# Patient Record
Sex: FEMALE | Race: WHITE | NOT HISPANIC OR LATINO | Employment: FULL TIME | URBAN - METROPOLITAN AREA
[De-identification: names, ages, dates, MRNs, and addresses within clinical notes are randomized per-mention and may not be internally consistent; named-entity substitution may affect disease eponyms.]

---

## 2018-05-04 ENCOUNTER — OFFICE VISIT - HEALTHEAST (OUTPATIENT)
Dept: FAMILY MEDICINE | Facility: CLINIC | Age: 23
End: 2018-05-04

## 2018-05-04 DIAGNOSIS — F41.9 ANXIETY DISORDER: ICD-10-CM

## 2018-05-04 ASSESSMENT — MIFFLIN-ST. JEOR: SCORE: 1343.9

## 2018-06-06 ENCOUNTER — COMMUNICATION - HEALTHEAST (OUTPATIENT)
Dept: FAMILY MEDICINE | Facility: CLINIC | Age: 23
End: 2018-06-06

## 2018-06-06 DIAGNOSIS — F41.9 ANXIETY DISORDER: ICD-10-CM

## 2018-06-18 ENCOUNTER — OFFICE VISIT - HEALTHEAST (OUTPATIENT)
Dept: FAMILY MEDICINE | Facility: CLINIC | Age: 23
End: 2018-06-18

## 2018-06-18 DIAGNOSIS — F41.9 ANXIETY DISORDER: ICD-10-CM

## 2018-06-18 ASSESSMENT — MIFFLIN-ST. JEOR: SCORE: 1348.89

## 2018-06-30 ENCOUNTER — COMMUNICATION - HEALTHEAST (OUTPATIENT)
Dept: FAMILY MEDICINE | Facility: CLINIC | Age: 23
End: 2018-06-30

## 2018-06-30 DIAGNOSIS — F41.9 ANXIETY DISORDER: ICD-10-CM

## 2018-07-09 ENCOUNTER — COMMUNICATION - HEALTHEAST (OUTPATIENT)
Dept: FAMILY MEDICINE | Facility: CLINIC | Age: 23
End: 2018-07-09

## 2018-07-09 DIAGNOSIS — F40.243 FEAR OF FLYING: ICD-10-CM

## 2018-07-09 DIAGNOSIS — F41.9 ANXIETY DISORDER: ICD-10-CM

## 2018-08-15 ENCOUNTER — OFFICE VISIT - HEALTHEAST (OUTPATIENT)
Dept: FAMILY MEDICINE | Facility: CLINIC | Age: 23
End: 2018-08-15

## 2018-08-15 DIAGNOSIS — F41.9 ANXIETY DISORDER: ICD-10-CM

## 2018-08-15 DIAGNOSIS — F40.243 FEAR OF FLYING: ICD-10-CM

## 2018-11-09 ENCOUNTER — COMMUNICATION - HEALTHEAST (OUTPATIENT)
Dept: FAMILY MEDICINE | Facility: CLINIC | Age: 23
End: 2018-11-09

## 2018-11-09 DIAGNOSIS — F41.9 ANXIETY DISORDER: ICD-10-CM

## 2019-01-10 ENCOUNTER — RECORDS - HEALTHEAST (OUTPATIENT)
Dept: ADMINISTRATIVE | Facility: OTHER | Age: 24
End: 2019-01-10

## 2019-01-10 LAB
CHLAMYDIA_EXT- HISTORICAL: NEGATIVE
PAP SMEAR - HIM PATIENT REPORTED: NORMAL
SPECIMEN DESCRIPTION_EXT (HISTORICAL CONVERSION): NORMAL

## 2019-01-23 ENCOUNTER — RECORDS - HEALTHEAST (OUTPATIENT)
Dept: ADMINISTRATIVE | Facility: OTHER | Age: 24
End: 2019-01-23

## 2019-02-27 ENCOUNTER — COMMUNICATION - HEALTHEAST (OUTPATIENT)
Dept: FAMILY MEDICINE | Facility: CLINIC | Age: 24
End: 2019-02-27

## 2019-02-27 DIAGNOSIS — F41.9 ANXIETY DISORDER: ICD-10-CM

## 2019-06-24 ENCOUNTER — COMMUNICATION - HEALTHEAST (OUTPATIENT)
Dept: FAMILY MEDICINE | Facility: CLINIC | Age: 24
End: 2019-06-24

## 2019-06-24 DIAGNOSIS — F40.243 FEAR OF FLYING: ICD-10-CM

## 2019-10-10 ENCOUNTER — COMMUNICATION - HEALTHEAST (OUTPATIENT)
Dept: SCHEDULING | Facility: CLINIC | Age: 24
End: 2019-10-10

## 2019-10-10 DIAGNOSIS — F41.9 ANXIETY DISORDER: ICD-10-CM

## 2019-10-10 DIAGNOSIS — F40.243 FEAR OF FLYING: ICD-10-CM

## 2019-10-26 ENCOUNTER — COMMUNICATION - HEALTHEAST (OUTPATIENT)
Dept: FAMILY MEDICINE | Facility: CLINIC | Age: 24
End: 2019-10-26

## 2019-10-26 DIAGNOSIS — F41.9 ANXIETY DISORDER: ICD-10-CM

## 2019-11-12 ENCOUNTER — OFFICE VISIT - HEALTHEAST (OUTPATIENT)
Dept: FAMILY MEDICINE | Facility: CLINIC | Age: 24
End: 2019-11-12

## 2019-11-12 ENCOUNTER — RECORDS - HEALTHEAST (OUTPATIENT)
Dept: HEALTH INFORMATION MANAGEMENT | Facility: CLINIC | Age: 24
End: 2019-11-12

## 2019-11-12 DIAGNOSIS — Z79.899 CONTROLLED SUBSTANCE AGREEMENT SIGNED: ICD-10-CM

## 2019-11-12 DIAGNOSIS — F40.243 FEAR OF FLYING: ICD-10-CM

## 2019-11-12 DIAGNOSIS — F41.9 ANXIETY DISORDER: ICD-10-CM

## 2019-11-12 LAB
AMPHETAMINES UR QL SCN: NORMAL
BARBITURATES UR QL: NORMAL
BENZODIAZ UR QL: NORMAL
CANNABINOIDS UR QL SCN: NORMAL
COCAINE UR QL: NORMAL
CREAT UR-MCNC: 31.9 MG/DL
METHADONE UR QL SCN: NORMAL
OPIATES UR QL SCN: NORMAL
OXYCODONE UR QL: NORMAL
PCP UR QL SCN: NORMAL

## 2019-11-12 ASSESSMENT — ANXIETY QUESTIONNAIRES
6. BECOMING EASILY ANNOYED OR IRRITABLE: NOT AT ALL
IF YOU CHECKED OFF ANY PROBLEMS ON THIS QUESTIONNAIRE, HOW DIFFICULT HAVE THESE PROBLEMS MADE IT FOR YOU TO DO YOUR WORK, TAKE CARE OF THINGS AT HOME, OR GET ALONG WITH OTHER PEOPLE: NOT DIFFICULT AT ALL
5. BEING SO RESTLESS THAT IT IS HARD TO SIT STILL: NOT AT ALL
1. FEELING NERVOUS, ANXIOUS, OR ON EDGE: SEVERAL DAYS
2. NOT BEING ABLE TO STOP OR CONTROL WORRYING: NOT AT ALL
4. TROUBLE RELAXING: SEVERAL DAYS
3. WORRYING TOO MUCH ABOUT DIFFERENT THINGS: SEVERAL DAYS
GAD7 TOTAL SCORE: 4
7. FEELING AFRAID AS IF SOMETHING AWFUL MIGHT HAPPEN: SEVERAL DAYS

## 2020-05-20 ENCOUNTER — COMMUNICATION - HEALTHEAST (OUTPATIENT)
Dept: FAMILY MEDICINE | Facility: CLINIC | Age: 25
End: 2020-05-20

## 2020-05-20 DIAGNOSIS — F41.9 ANXIETY DISORDER: ICD-10-CM

## 2020-09-29 ENCOUNTER — COMMUNICATION - HEALTHEAST (OUTPATIENT)
Dept: FAMILY MEDICINE | Facility: CLINIC | Age: 25
End: 2020-09-29

## 2020-09-29 DIAGNOSIS — F41.9 ANXIETY DISORDER: ICD-10-CM

## 2020-09-29 RX ORDER — HYDROXYZINE HYDROCHLORIDE 25 MG/1
TABLET, FILM COATED ORAL
Qty: 30 TABLET | Refills: 3 | Status: SHIPPED | OUTPATIENT
Start: 2020-09-29 | End: 2021-11-10

## 2020-12-17 ENCOUNTER — COMMUNICATION - HEALTHEAST (OUTPATIENT)
Dept: FAMILY MEDICINE | Facility: CLINIC | Age: 25
End: 2020-12-17

## 2020-12-17 DIAGNOSIS — F40.243 FEAR OF FLYING: ICD-10-CM

## 2020-12-22 ENCOUNTER — OFFICE VISIT - HEALTHEAST (OUTPATIENT)
Dept: FAMILY MEDICINE | Facility: CLINIC | Age: 25
End: 2020-12-22

## 2020-12-22 DIAGNOSIS — F41.9 ANXIETY DISORDER: ICD-10-CM

## 2020-12-22 DIAGNOSIS — F40.243 FEAR OF FLYING: ICD-10-CM

## 2020-12-22 LAB
AMPHETAMINES UR QL SCN: NORMAL
BARBITURATES UR QL: NORMAL
BENZODIAZ UR QL: NORMAL
CANNABINOIDS UR QL SCN: NORMAL
COCAINE UR QL: NORMAL
CREAT UR-MCNC: 116.8 MG/DL
METHADONE UR QL SCN: NORMAL
OPIATES UR QL SCN: NORMAL
OXYCODONE UR QL: NORMAL
PCP UR QL SCN: NORMAL

## 2020-12-22 RX ORDER — LORAZEPAM 0.5 MG/1
TABLET ORAL
Qty: 10 TABLET | Refills: 0 | Status: SHIPPED | OUTPATIENT
Start: 2020-12-22

## 2020-12-22 RX ORDER — SERTRALINE HYDROCHLORIDE 100 MG/1
100 TABLET, FILM COATED ORAL DAILY
Qty: 90 TABLET | Refills: 1 | Status: SHIPPED | OUTPATIENT
Start: 2020-12-22 | End: 2022-01-24

## 2021-03-02 ENCOUNTER — COMMUNICATION - HEALTHEAST (OUTPATIENT)
Dept: FAMILY MEDICINE | Facility: CLINIC | Age: 26
End: 2021-03-02

## 2021-03-02 DIAGNOSIS — F41.9 ANXIETY DISORDER: ICD-10-CM

## 2021-05-28 ASSESSMENT — ANXIETY QUESTIONNAIRES: GAD7 TOTAL SCORE: 4

## 2021-05-29 NOTE — TELEPHONE ENCOUNTER
Controlled Substance Refill Request   Patient is flying Friday.  Please review.  Medication Name:   Requested Prescriptions     Pending Prescriptions Disp Refills     LORazepam (ATIVAN) 0.5 MG tablet [Pharmacy Med Name: LORAZEPAM 0.5MG TABLETS] 2 tablet 0     Sig: TAKE 1/2 TABLET BY MOUTH WITH FLYING. IF NOT EFFECTIVE, CAN TAKE THE OTHER HALF     Date Last Fill: 8/15/19  Pharmacy: [facility]      Submit electronically to pharmacy  Controlled Substance Agreement Date Scanned:   Encounter-Level CSA Scan Date:    There are no encounter-level csa scan date.       Last office visit with prescriber/PCP: 6/18/2018 Lakisha Zepeda MD OR same dept: 8/15/2018 Loraine Morel CNP OR same specialty: 8/15/2018 Loraine Morel CNP  Last physical: Visit date not found Last MTM visit: Visit date not found

## 2021-06-01 VITALS — WEIGHT: 137.9 LBS | HEIGHT: 64 IN | BODY MASS INDEX: 23.54 KG/M2

## 2021-06-01 VITALS — BODY MASS INDEX: 24 KG/M2 | WEIGHT: 138 LBS

## 2021-06-01 VITALS — BODY MASS INDEX: 23.35 KG/M2 | WEIGHT: 136.8 LBS | HEIGHT: 64 IN

## 2021-06-02 NOTE — TELEPHONE ENCOUNTER
Pt stated she is going out of town next week, but will call back to schedule an est care appt when she gets back.

## 2021-06-02 NOTE — TELEPHONE ENCOUNTER
Patient stated she is going her trip on 10/17/19. Patient was made aware she needs to establish care with a new PCP. Patient declined to go to scheduling.    Controlled Substance Refill Request  Medication Name:   Requested Prescriptions     Pending Prescriptions Disp Refills     LORazepam (ATIVAN) 0.5 MG tablet 2 tablet 0     Sig: TAKE 1/2 TABLET BY MOUTH WITH FLYING. IF NOT EFFECTIVE, CAN TAKE THE OTHER HALF     Date Last Fill: 6/24/19  [facility]      Submit electronically to pharmacy  Controlled Substance Agreement Date Scanned:   Encounter-Level CSA Scan Date:    There are no encounter-level csa scan date.       Last office visit with prescriber/PCP: Visit date not found OR same dept: Visit date not found OR same specialty: Visit date not found  Last physical: Visit date not found Last MTM visit: Visit date not found

## 2021-06-02 NOTE — TELEPHONE ENCOUNTER
Former patient of Joe & has not established care with another provider.  Please assign refill request to covering provider per Clinic standard process.      RN cannot approve Refill Request    RN can NOT refill this medication Protocol failed and NO refill given      Dalia Rivero, [facility] Triage/Med Refill 10/11/2019    Requested Prescriptions   Pending Prescriptions Disp Refills     hydrOXYzine HCl (ATARAX) 25 MG tablet 30 tablet 5     Sig: TAKE 1 TO 2 TABLETS BY MOUTH THREE TIMES DAILY AS NEEDED FOR ITCHING       Antihistamine Refill Protocol Failed - 10/10/2019  8:48 AM        Failed - Patient has had office visit/physical in last year     Last office visit with prescriber/PCP: Visit date not found OR same dept: Visit date not found OR same specialty: Visit date not found  Last physical: Visit date not found Last MTM visit: Visit date not found   Next visit within 3 mo: Visit date not found  Next physical within 3 mo: Visit date not found  Prescriber OR PCP: No Primary Care Provider  Last diagnosis associated with med order: 1. Anxiety disorder  - hydrOXYzine HCl (ATARAX) 25 MG tablet; TAKE 1 TO 2 TABLETS BY MOUTH THREE TIMES DAILY AS NEEDED FOR ITCHING  Dispense: 30 tablet; Refill: 5    If protocol passes may refill for 12 months if within 3 months of last provider visit (or a total of 15 months).

## 2021-06-02 NOTE — TELEPHONE ENCOUNTER
Medication: Lorazepam    Last Date Filled 06/24/19     pulled: NO  Unable to pull     Only PCP Prescribing?: NO    Taken as prescribed from physician notes?:   Allison Babin is a 23 y.o. female who presents for follow up of anxiety disorder.  Was seen by Dr. Zepeda June 18, 2018 for increasing anxiety symptoms.  She was increased to 50 mg of Zoloft.  Her mother called in after that and  and spoke with Dr. Zepeda saying that the Zoloft was still not helpful.  She had a sister who is taking 100 mg of Zoloft and made a huge difference in her anxiety symptoms.  Dr. Zepeda then increased patient's dose 200 mg of Zoloft daily.  Given her a prescription for Ativan for flying anxiety, and use hydroxyzine as needed for panic attacks.  Today patient states that her her symptoms are pretty much gone.  She only has to take hydroxyzine may be once every couple weeks, she does have a an airplane flight coming up and would like a refill of Ativan for it.  She feels really good right now.  Feels this is the right dose for her.  Denies any side effects.  Current symptoms: none. She denies current suicidal and homicidal ideation. She complains of the following side effects from the treatment: none.    CSA in last year: NO    Random Utox in last year: NO    Opioids + benzodiazepines? NO

## 2021-06-02 NOTE — TELEPHONE ENCOUNTER
Please help patient set an appointment, set as physical if due.  Thank you.  Please establish care with a new provider before further refills.

## 2021-06-02 NOTE — TELEPHONE ENCOUNTER
Patient needs to come in for follow-up and establishing care with a new provider as Dr. Diaz has left.  Otherwise she will not get any more refills of her medications in the future.

## 2021-06-02 NOTE — TELEPHONE ENCOUNTER
RN cannot approve Refill Request    RN can NOT refill this medication PCP messaged that patient is overdue for Office Visit. Last office visit: 6/18/2018 Lakisha Zepeda MD Last Physical: Visit date not found Last MTM visit: Visit date not found Last visit same specialty: 8/15/2018 Loraine Morel CNP.  Next visit within 3 mo: Visit date not found  Next physical within 3 mo: Visit date not found      [facility] Connection Triage/Med Refill 10/26/2019    Requested Prescriptions   Pending Prescriptions Disp Refills     sertraline (ZOLOFT) 100 MG tablet [Pharmacy Med Name: SERTRALINE 100MG TABLETS] 90 tablet 0     Sig: TAKE 1 TABLET BY MOUTH DAILY.       SSRI Refill Protocol  Failed - 10/26/2019  3:33 AM        Failed - PCP or prescribing provider visit in last year     Last office visit with prescriber/PCP: 6/18/2018 Lakisha Zepeda MD OR same dept: Visit date not found OR same specialty: 8/15/2018 Loraine Morel CNP  Last physical: Visit date not found Last MTM visit: Visit date not found   Next visit within 3 mo: Visit date not found  Next physical within 3 mo: Visit date not found  Prescriber OR PCP: Lakisha Zepeda MD  Last diagnosis associated with med order: 1. Anxiety disorder  - sertraline (ZOLOFT) 100 MG tablet [Pharmacy Med Name: SERTRALINE 100MG TABLETS]; TAKE 1 TABLET BY MOUTH DAILY.  Dispense: 90 tablet; Refill: 0    If protocol passes may refill for 12 months if within 3 months of last provider visit (or a total of 15 months).

## 2021-06-03 VITALS
WEIGHT: 143 LBS | BODY MASS INDEX: 24.87 KG/M2 | DIASTOLIC BLOOD PRESSURE: 65 MMHG | RESPIRATION RATE: 16 BRPM | SYSTOLIC BLOOD PRESSURE: 104 MMHG | HEART RATE: 70 BPM | OXYGEN SATURATION: 98 %

## 2021-06-03 NOTE — PROGRESS NOTES
Assessment:     1. Anxiety disorder  hydrOXYzine HCl (ATARAX) 25 MG tablet    Drug Abuse 1+, Urine   2. Fear of flying  LORazepam (ATIVAN) 0.5 MG tablet    Drug Abuse 1+, Urine   3. Controlled substance agreement signed       Anxiety seems to be stable.  CSA signed.  Discussed protocol.  Patient is agreeable     Plan:      The diagnosis was discussed with the patient and evaluation and treatment plans outlined.  See orders for lab and imaging studies.     Subjective:      Allison Babin is a  female who presents for evaluation of   Chief Complaint   Patient presents with     Medication Management     Pt here today for medication check and refill request      Patient with generalized anxiety disorder is here today for medication refill for Ativan that she uses sparingly for flying.  She is due to fly to [place] and later to [place].  10 pills usually last her 1 year.  There is no misuse history.  Anxiety stable on Zoloft and hydroxyzine.  She denies any acute concerns.    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  She lives with her parents.    The following portions of the patient's history were reviewed and updated as appropriate: allergies, current medications, past family history, past medical history, past social history, past surgical history and problem list.  Allergies   Allergen Reactions     Amoxicillin Rash     Annotation: ok with amoxicillin liquid         Current Outpatient Medications on File Prior to Visit   Medication Sig Dispense Refill     sertraline (ZOLOFT) 100 MG tablet TAKE 1 TABLET BY MOUTH DAILY. 90 tablet 0     [DISCONTINUED] hydrOXYzine HCl (ATARAX) 25 MG tablet TAKE 1 TO 2 TABLETS BY MOUTH THREE TIMES DAILY AS NEEDED FOR ITCHING 30 tablet 3     [DISCONTINUED] LORazepam (ATIVAN) 0.5 MG tablet Take half a tablet with flying. If not effective, can take the other half 10 tablet 0     [DISCONTINUED] LORazepam (ATIVAN) 0.5 MG tablet  TAKE 1/2 TABLET BY MOUTH WITH FLYING. IF NOT EFFECTIVE, CAN TAKE THE OTHER HALF 2 tablet 0     No current facility-administered medications on file prior to visit.        Patient Active Problem List   Diagnosis     Anxiety disorder     Controlled substance agreement signed #10 pills Atival q6-12 months       Past Medical History:   Diagnosis Date     Amenorrhea     Created by Conversion      Anorexia     in high school     Bulimia     in high school       History reviewed. No pertinent surgical history.    Family History   Problem Relation Age of Onset     Anxiety disorder Mother      Anxiety disorder Sister      Asthma Sister      Anxiety disorder Maternal Grandfather      Hypertension Maternal Grandfather      Dementia Maternal Grandmother      Lung cancer Paternal Grandmother      Hypertension Paternal Grandfather      Heart disease Paternal Grandfather        Social History     Socioeconomic History     Marital status: Single     Spouse name: None     Number of children: None     Years of education: None     Highest education level: None   Occupational History     None   Social Needs     Financial resource strain: None     Food insecurity:     Worry: None     Inability: None     Transportation needs:     Medical: None     Non-medical: None   Tobacco Use     Smoking status: Never Smoker     Smokeless tobacco: Never Used   Substance and Sexual Activity     Alcohol use: Yes     Frequency: Monthly or less     Drinks per session: 1 or 2     Binge frequency: Never     Comment: 1-2 drinks a couple times month     Drug use: No     Sexual activity: Not Currently     Partners: Male   Lifestyle     Physical activity:     Days per week: None     Minutes per session: None     Stress: None   Relationships     Social connections:     Talks on phone: None     Gets together: None     Attends Mandaeism service: None     Active member of club or organization: None     Attends meetings of clubs or organizations: None      "Relationship status: None     Intimate partner violence:     Fear of current or ex partner: None     Emotionally abused: None     Physically abused: None     Forced sexual activity: None   Other Topics Concern     None   Social History Narrative    Taking a year off after graduating from [employer]ines[employer], [place] as a  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        Currently working with \"[employer]\".  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       Review of Systems  Constitutional: negative  Cardiovascular: negative  Behavioral/Psych: negative       Objective:   /65 (Patient Site: Left Arm, Patient Position: Sitting, Cuff Size: Adult Regular)   Pulse 70   Resp 16   Wt 143 lb (64.9 kg)   SpO2 98%   BMI 24.87 kg/m          General Appearance: healthy, alert, oriented and no distress  HEENT Exam: Oropharynx clear without lesion or exudate  Neck:  supple, no adenopathy, thyroid normal  Heart: Normal heart sounds, S1 and S2, No murmurs.  Lungs: Normal breath sounds, Clear to auscultation bilateral  Skin exam: No visible or palpable abnormalities  Neurological exam: gait normal, alert and oriented X 3  Hem/Lymph/Immuno exam: negative findings:  no cervical nodes, no supraclavicular nodes,     Little interest or pleasure in doing things: Not at all  Feeling down, depressed, or hopeless: Not at all    Feeling nervous, anxious or on edge: 1 (2019 11:00 AM)  Not being able to stop or control worry: 0 (2019 11:00 AM)  Worrying too much about different things: 1 (2019 11:00 AM)  Trouble relaxin (2019 11:00 AM)  Being so restless that is is hard to sit still: 0 (2019 11:00 AM)  Becoming easily annnoyed or irritable: 0 (2019 11:00 AM)  Feeling afraid as if something awful might happen: 1 (2019 11:00 " AM)  LUIS-7 Total: 4 (11/12/2019 11:00 AM)  How difficult did these problems make it for you to do your work, take care of things at home or get along with other people? : Not difficult at all (11/12/2019 11:00 AM)  How difficult did these problems make it for you to do your work, take care of things at home or get along with other people? : Not difficult at all (11/12/2019 11:00 AM)

## 2021-06-05 VITALS
BODY MASS INDEX: 24.52 KG/M2 | HEART RATE: 74 BPM | SYSTOLIC BLOOD PRESSURE: 103 MMHG | TEMPERATURE: 97.7 F | WEIGHT: 141 LBS | RESPIRATION RATE: 16 BRPM | DIASTOLIC BLOOD PRESSURE: 68 MMHG

## 2021-06-08 NOTE — TELEPHONE ENCOUNTER
Refill Approved    Rx renewed per Medication Renewal Policy. Medication was last renewed on 10/28/19, last OV 11/12/19 .    Jolene Abdul, [facility] Triage/Med Refill 5/25/2020     Requested Prescriptions   Pending Prescriptions Disp Refills     sertraline (ZOLOFT) 100 MG tablet [Pharmacy Med Name: SERTRALINE 100MG TABLETS] 90 tablet 0     Sig: TAKE 1 TABLET BY MOUTH DAILY       SSRI Refill Protocol  Passed - 5/20/2020  5:07 PM        Passed - PCP or prescribing provider visit in last year     Last office visit with prescriber/PCP: Visit date not found OR same dept: 11/12/2019 Mindy Martinez MD OR same specialty: 11/12/2019 Mindy Martinez MD  Last physical: Visit date not found Last MTM visit: Visit date not found   Next visit within 3 mo: Visit date not found  Next physical within 3 mo: Visit date not found  Prescriber OR PCP: Geno Moore MD  Last diagnosis associated with med order: 1. Anxiety disorder  - sertraline (ZOLOFT) 100 MG tablet [Pharmacy Med Name: SERTRALINE 100MG TABLETS]; TAKE 1 TABLET BY MOUTH DAILY.  Dispense: 90 tablet; Refill: 0    If protocol passes may refill for 12 months if within 3 months of last provider visit (or a total of 15 months).

## 2021-06-11 NOTE — TELEPHONE ENCOUNTER
Refill Approved    Rx renewed per Medication Renewal Policy. Medication was last renewed on 11/12/19.    Dalia Rivero, [facility] Connection Triage/Med Refill 9/29/2020     Requested Prescriptions   Pending Prescriptions Disp Refills     hydrOXYzine HCL (ATARAX) 25 MG tablet 30 tablet 3     Sig: TAKE 1 TO 2 TABLETS BY MOUTH THREE TIMES DAILY AS NEEDED FOR ITCHING       Antihistamine Refill Protocol Passed - 9/29/2020  3:18 PM        Passed - Patient has had office visit/physical in last year     Last office visit with prescriber/PCP: 6/18/2018 Lakisha Zepeda MD OR same dept: 11/12/2019 Mindy Martinez MD OR same specialty: 11/12/2019 Mindy Martinez MD  Last physical: Visit date not found Last MTM visit: Visit date not found   Next visit within 3 mo: Visit date not found  Next physical within 3 mo: Visit date not found  Prescriber OR PCP: Lakisha Zepeda MD  Last diagnosis associated with med order: 1. Anxiety disorder  - hydrOXYzine HCL (ATARAX) 25 MG tablet; TAKE 1 TO 2 TABLETS BY MOUTH THREE TIMES DAILY AS NEEDED FOR ITCHING  Dispense: 30 tablet; Refill: 3    If protocol passes may refill for 12 months if within 3 months of last provider visit (or a total of 15 months).

## 2021-06-11 NOTE — TELEPHONE ENCOUNTER
Refill Request  Did you contact pharmacy: No  Medication name:   Requested Prescriptions     Pending Prescriptions Disp Refills     hydrOXYzine HCL (ATARAX) 25 MG tablet 30 tablet 3     Sig: TAKE 1 TO 2 TABLETS BY MOUTH THREE TIMES DAILY AS NEEDED FOR ITCHING   Who prescribed the medication: HARVEY Castillo  Requested Pharmacy: [facility] # [id number]  Is patient out of medication: Yes  Patient notified refills processed in 3 business days:  yes  Okay to leave a detailed message: no

## 2021-06-13 ENCOUNTER — HEALTH MAINTENANCE LETTER (OUTPATIENT)
Age: 26
End: 2021-06-13

## 2021-06-13 NOTE — TELEPHONE ENCOUNTER
Requested Prescriptions     Pending Prescriptions Disp Refills     LORazepam (ATIVAN) 0.5 MG tablet 10 tablet 0     Sig: Take half a tablet with flying. If not effective, can take the other half     Last Office Visit:  11/12/2019  Last Refill:  11/12/2019  CSA:  N/A  Date of Last Labs:  N/A

## 2021-06-13 NOTE — PROGRESS NOTES
Assessment/plan   1. Fear of flying  2. Anxiety disorder  Anxiety stable with PHQ 9 and darrion 7 scores of 0.  Sertraline refilled.   reviewed, no Ativan prescriptions in the past year, she uses this very infrequently for fear of flying.  We will obtain a UDS and as long as appropriate will send in a refill.  Controlled substance agreement signed.  - Drug Abuse 1+, Urine  - sertraline (ZOLOFT) 100 MG tablet; Take 1 tablet (100 mg total) by mouth daily.  Dispense: 90 tablet; Refill: 1      Jenn Rawls DO    Options for treatment and follow-up care were reviewed with the patient. Allison Babin engaged in the decision making process and verbalized understanding of the options discussed and agreed with the final plan.    This note has been dictated using voice recognition software. Any grammatical or context distortions are unintentional and inherent to the software.    Subjective:      HPI: Allison Babin is a 25 y.o. female who is here for:    Chief Complaint   Patient presents with     Medication Management     Medication Refill     Patient has known fear of flying and uses Ativan during her travels.  Also takes hydroxyzine as needed for anxiety symptoms.  She typically takes 0.5 to 1 mg depending on the length of the flight.  She is going to [place] for [place] and would like her Ativan refilled.  It has been over a year since her last refill.  Her mood has been stable on the sertraline.    Medical History:  Patient Active Problem List   Diagnosis     Anxiety disorder     Controlled substance agreement signed #10 pills Atival q6-12 months     Past Medical History:   Diagnosis Date     Amenorrhea     Created by Conversion      Anorexia     in high school     Bulimia     in high school       Medications:  Current Outpatient Medications   Medication Sig Dispense Refill     hydrOXYzine HCL (ATARAX) 25 MG tablet TAKE 1 TO 2 TABLETS BY MOUTH THREE TIMES DAILY AS NEEDED FOR ITCHING 30 tablet 3     LORazepam  (ATIVAN) 0.5 MG tablet Take half a tablet with flying. If not effective, can take the other half 10 tablet 0     sertraline (ZOLOFT) 100 MG tablet Take 1 tablet (100 mg total) by mouth daily. 90 tablet 1     No current facility-administered medications for this visit.        Objective:    /68 (Patient Site: Left Arm, Patient Position: Sitting, Cuff Size: Adult Regular)   Pulse 74   Temp 97.7  F (36.5  C) (Oral)   Resp 16   Wt 141 lb (64 kg)   LMP 11/20/2020   BMI 24.52 kg/m      Physical Exam:   General: Alert, pleasant, cooperative, NAD  HEENT: NC/AT, EOMI, PERRL, normal conjunctivae and sclerae  CV: RRR, no murmurs, rubs or gallops, 2+ peripheral pulses  Respiratory: normal effort, lungs CTAB with good aeration throughout  Psych: mood neutral and affect appropriate  Neuro: A&O x 3, CN II-XII grossly intact, no focal deficits  Skin: warm, no rashes on exposed areas of skin    No results found for this or any previous visit (from the past 24 hour(s)).

## 2021-06-16 PROBLEM — F41.9 ANXIETY DISORDER: Status: ACTIVE | Noted: 2018-05-04

## 2021-06-16 PROBLEM — Z79.899 CONTROLLED SUBSTANCE AGREEMENT SIGNED: Status: ACTIVE | Noted: 2019-11-12

## 2021-06-17 NOTE — PROGRESS NOTES
ASSESSMENT/ PLAN    1. Anxiety disorder  PHQ-9 is 1 and LUIS-7 is 6 today. Not improving and worsening. Triggers include flying or driving.  Panic attacks a couple times a week.  Sister with similar anxiety has seen benefit from Zoloft.  Mother takes hydroxyzine as needed for panic attacks.  Will start patient on Zoloft 50 mg daily as well as hydroxyzine 1-2 tablets by mouth 3 times daily as needed.  Return to clinic in 2 weeks for follow-up.  She is on a waiting list to see a [employer] counselor through her school.  - sertraline (ZOLOFT) 50 MG tablet; Take 1 tablet (50 mg total) by mouth daily.  Dispense: 30 tablet; Refill: 0  - hydrOXYzine HCl (ATARAX) 25 MG tablet; Take 1-2 tablets (25-50 mg total) by mouth 3 (three) times a day as needed for anxiety.  Dispense: 30 tablet; Refill: 1    This note was created using Dragon dictation software, spelling errors may occur.     Lakisha Zepeda MD      SUBJECTIVE   Allison Scalesong is a 22 y.o. 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 Has had symptoms for about 8 years. Getting worse. Her mother is here with her. It's gotten better and not worse. Has had panic attacks. Her sister has started taking medication Sertraline for anxiety and seeing benefit. Driving and flying trigger her panic attacks. She has claustrophobia. She has to sit by the door at school so she can quickly leave the room. Her panic attacks occur a few times a week, lasting a couple minutes, has SOB, heart racing. Calling someone helps with these attacks. Had eating disorder when she was younger - aged [age], both anorexia and bulimia, didn't have any treatment for this. No longer having any symptoms for eating disorder. Denies nightmares. Denies self injurious behavior. She does get her periods, getting more regular. She denies sexual abuse  "or physical abuse and feels safe. She was traumatized when she was in middle school when another kid from school committed suicide although she did not know this kid could well.  She denies drug use, alcohol use or tobacco use.  She is not currently sexually active and does not have any high risk sexual behavior.  She denies previous suicidal attempts.  She is currently not suicidal.  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      Review of Systems:  Negative except as noted in HPI      The following portions of the patient's history were reviewed and updated as appropriate: past medical history, past surgical history, family history, allergies, current medications and problem list.    Medical History  Active Ambulatory (Non-Hospital) Problems    Diagnosis     Anxiety disorder     Past Medical History:   Diagnosis Date     Amenorrhea      Anorexia      Bulimia        Surgical History  She  has no past surgical history on file.    Social History  Reviewed, and she  reports that she has never smoked. She has never used smokeless tobacco. She reports that she drinks alcohol. She reports that she does not use illicit drugs.    Family History  Reviewed, and family history includes Anxiety disorder in her maternal grandfather, mother, and sister.    Medications  Reviewed and reconciled    Allergies  Allergies   Allergen Reactions     Amoxicillin Rash     Annotation: ok with amoxicillin liquid           OBJECTIVE  Physical Exam:  Vital signs: /72 (Patient Site: Left Arm, Patient Position: Sitting, Cuff Size: Adult Regular)  Pulse 84  Temp 98.2  F (36.8  C) (Oral)   Resp 12  Ht 5' 3.58\" (1.615 m)  Wt 136 lb 12.8 oz (62.1 kg)  LMP 04/15/2018  BMI 23.79 kg/m2  Weight: 136 lb 12.8 oz (62.1 kg)    General appearance: pleasant, appears stated age, cooperative and " in no distress  Eyes: EOMs intact, PERRL, conjunctivae normal.   ENT: moist oral mucosa, posterior oropharynx normal, thyroid normal to palpation.  Lymph: no cervical/supraclavicular adenopathy  Respiratory: clear to auscultation bilaterally, good air movement throughout, no wheezing or crackles, speaking full sentences without difficulty  Cardiovascular: regular rate and rhythm, no murmur appreciated, no leg edema  Abdomen: active bowel sounds, soft, non-tender, non-distended  Skin: no rashes  Neuro: alert oriented x 3, grossly normal otherwise  Psych: Anxious affect, mood and affect congruent.  Pressured speech, insight fair, appropriate conversation

## 2021-06-18 NOTE — PROGRESS NOTES
ASSESSMENT/ PLAN    1. Anxiety disorder  Improving.  We will continue with current dose of Zoloft 50 mg daily.  PHQ 9 today 0.  LUIS 7 today's 1.  No panic attacks.  No side effects from Zoloft.  Return in 6 months for annual physical examination and follow-up of anxiety.        This note was created using Dragon dictation software, spelling errors may occur.     Lakisha Zepeda MD        SUBJECTIVE   Allison Babin is a 23 y.o. old female with a past medical history of anxiety who presented to clinic today for further evaluation of follow up of anxiety. PHQ 9 today is 0 and LUIS 7 today is 1.  Patient reports that the Zoloft 50 mg daily works well for her.  She has not have not needed to use hydroxyzine as needed for anxiety.  Her panic attacks have not happened recently.  She denies any side effects from Zoloft.  She would like to continue with the current dose.  Denies any suicidal ideation or homicidal ideation.  No eating disorder symptoms.  No self-injurious behavior.  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  In the year she will enter a [employer] counseling masters program.  Her sister is here with her today.  Her sister also has diabetes and is on Zoloft as well.  Patient has no other concerns today.      Review of Systems:  Negative except as noted in HPI      The following portions of the patient's history were reviewed and updated as appropriate: past medical history, past surgical history, family history, allergies, current medications and problem list.    Medical History  Active Ambulatory (Non-Hospital) Problems    Diagnosis     Anxiety disorder     Past Medical History:   Diagnosis Date     Amenorrhea      Anorexia      Bulimia        Surgical History  She  has no past surgical history on file.    Social History  Reviewed, and she  reports that she has never smoked. She has never used  "smokeless tobacco. She reports that she drinks alcohol. She reports that she does not use illicit drugs.   Social History     Social History Narrative    Taking a year off after graduating from [employer] kinesiology, [employer] as a  and also as a dance instructor. Will be entering [employer] counseling program in a year.       Family History  Reviewed, and family history includes Anxiety disorder in her maternal grandfather, mother, and sister; Asthma in her sister; Dementia in her maternal grandmother; Heart disease in her paternal grandfather; Hypertension in her maternal grandfather and paternal grandfather; Lung cancer in her paternal grandmother.    Medications  Reviewed and reconciled    Allergies  Allergies   Allergen Reactions     Amoxicillin Rash     Annotation: ok with amoxicillin liquid           OBJECTIVE  Physical Exam:  Vital signs: BP 94/60 (Patient Site: Left Arm, Patient Position: Sitting, Cuff Size: Adult Regular)  Pulse 68  Temp 98  F (36.7  C) (Oral)   Resp 16  Ht 5' 3.58\" (1.615 m)  Wt 137 lb 14.4 oz (62.6 kg)  BMI 23.98 kg/m2  Weight: 137 lb 14.4 oz (62.6 kg)    General appearance: pleasant, appears stated age, cooperative and in no distress  Eyes: EOMs intact, PERRL, conjunctivae normal.   ENT: moist oral mucosa, posterior oropharynx normal, thyroid normal to palpation, no lump or mass  Lymph: no cervical/supraclavicular adenopathy  Respiratory: clear to auscultation bilaterally, good air movement throughout, no wheezing or crackles, speaking full sentences without difficulty  Cardiovascular: regular rate and rhythm, no murmur appreciated, no leg edema  Abdomen: active bowel sounds, soft, non-tender, non-distended  Skin: no rashes  Neuro: alert oriented x 3, grossly normal otherwise  Psych: normal affect, affect and mood congruent, insight appropriate, speech normal rate, appropriate conversation       "

## 2021-06-19 NOTE — PROGRESS NOTES
Assessment:      Anxiety Disorder - improving      Plan:      Medications: Ativan, Zoloft and hydroxyzine as needed for panic attacks.  Reviewed concept of anxiety as biochemical imbalance of neurotransmitters and rationale for treatment.  Instructed patient to contact office or on-call physician promptly should condition worsen or any new symptoms appear and provided on-call telephone numbers. IF THE PATIENT HAS ANY SUICIDAL OR HOMICIDAL IDEATIONS, CALL THE OFFICE, DISCUSS WITH A SUPPORT MEMBER, OR GO TO THE ER IMMEDIATELY. Patient was agreeable with this plan.  Follow up: Ativan, Zoloft and Hydroxyzine as needed for panic attacks 1 year or sooner if needed.     Subjective:       Allison Babin is a 23 y.o. female who presents for follow up of anxiety disorder.  Was seen by Dr. Zepeda June 18, 2018 for increasing anxiety symptoms.  She was increased to 50 mg of Zoloft.  Her mother called in after that and  and spoke with Dr. Zepeda saying that the Zoloft was still not helpful.  She had a sister who is taking 100 mg of Zoloft and made a huge difference in her anxiety symptoms.  Dr. Zepeda then increased patient's dose 200 mg of Zoloft daily.  Given her a prescription for Ativan for flying anxiety, and use hydroxyzine as needed for panic attacks.  Today patient states that her her symptoms are pretty much gone.  She only has to take hydroxyzine may be once every couple weeks, she does have a an airplane flight coming up and would like a refill of Ativan for it.  She feels really good right now.  Feels this is the right dose for her.  Denies any side effects.  Current symptoms: none. She denies current suicidal and homicidal ideation. She complains of the following side effects from the treatment: none.    LUIS-7 0    The following portions of the patient's history were reviewed and updated as appropriate: allergies, current medications, past family history, past medical history, past social history, past  surgical history and problem list.      Objective:      /63  Pulse (!) 58  Resp 18  Wt 138 lb (62.6 kg)  BMI 24 kg/m2   General:  alert, appears stated age and cooperative   Affect/Behavior:  full facial expressions, good grooming, good insight, normal perception, normal reasoning, normal speech pattern and content and normal thought patterns none

## 2021-06-19 NOTE — LETTER
Letter by Mindy Martinez MD at      Author: Mindy Martinez MD Service: -- Author Type: --    Filed:  Encounter Date: 11/12/2019 Status: Signed         [facility] MEDICINE/OB  11/12/19    Patient: Allison Babin  YOB: 1995  Medical Record Number: [id number]  CSN: [id number]                                                                              Non-opioid Controlled Substance Agreement    I understand that my care provider has prescribed a controlled substance to help manage my condition(s). I am taking this medicine to help me function or work. I know this is strong medicine, and that it can cause serious side effects. Controlled substances can be sedating, addicting and may cause a dependency on the drug. They can affect my ability to drive or think, and cause depression. They need to be taken exactly as prescribed. Combining controlled substances with certain medicines or chemicals (such as cocaine, sedatives and tranquilizers, sleeping pills, meth) can be dangerous or even fatal. Also, if I stop controlled substances suddenly, I may have severe withdrawal symptoms.  If not helpful, I may be asked to stop them.    The risks, benefits, and side effects of these medicine(s) were explained to me. I agree that:    1. I will take part in other treatments as advised by my care team. This may be psychiatry or counseling, physical therapy, behavioral therapy, group treatment or a referral to a pain clinic. I will reduce or stop my medicine when my care team tells me to do so.  2. I will take my medicines as prescribed. I will not change the dose or schedule unless my care team tells me to. There will be no refills if I run out early.  I may be contactedwithout warning and asked to complete a urine drug test or pill count at any time.   3. I will keep all my appointments, and understand this is part of the monitoring of controlled substances. My care team may require an office visit for  EVERY controlled substance refill. If I miss appointments or dont follow instructions, my care team may stop my medicine.  4. I will not ask other providers to prescribe controlled substances, and I will not accept controlled substances from other people. If I need another prescribed controlled substance for a new reason, I will tell my care team within 1 business day.  5. I will use one pharmacy to fill all of my controlled substance prescriptions, and it is up to me to make sure that I do not run out of my medicines on weekends or holidays. If my care team is willing to refill my controlled substance prescription without a visit, I must request refills only during office hours, refills may take up to 3 days to process, and it may take up to 5 to 7 days for my medicine to be mailed and ready at my pharmacy. Prescriptions will not be mailed anywhere except my pharmacy.    6. I am responsible for my prescriptions. If the medicine/prescription is lost or stolen, it will not be replaced. I also agree not to share controlled substance medicines with anyone.          [facility] FAMILY MEDICINE/OB  11/12/19  Patient:  Allison Babin  YOB: 1995  Medical Record Number: [id number]  CSN: [id number]    7. I agree to not use ANY illegal or recreational drugs. This includes marijuana, cocaine, bath salts or other drugs. I agree not to use alcohol unless my care team says I may. I agree to give urine samples whenever asked. If I dont give a urine sample, the care team may stop my medicine.    8. If I enroll in the [place] Medical Marijuana program, I will tell my care team. I will also sign an agreement to share my medical records with my care team.    9. I will bring in my list of medicines (or my medicine bottles) each time I come to the clinic.   10. I will tell my care team right away if I become pregnant or have a new medical problem treated outside of my regular clinic.  11. I understand that this medicine  can affect my thinking and judgment. It may be unsafe for me to drive, use machinery and do dangerous tasks. I will not do any of these things until I know how the medicine affects me. If my dose changes, I will wait to see how it affects me. I will contact my care team if I have concerns about medicine side effects.    I understand that if I do not follow any of the conditions above, my prescriptions or treatment may be stopped.      I agree that my provider, clinic care team, and pharmacy may work with any city, state or federal law enforcement agency that investigates the misuse, sale, or other diversion of my controlled medicine. I will allow my provider to discuss my care with or share a copy of this agreement with any other treating provider, pharmacy or emergency room where I receive care. I agree to give up (waive) any right of privacy or confidentiality with respect to these consents.   I have read this agreement and have asked questions about anything I did not understand.    ___________________________________________________________________________  Patient signature - Date/Time  -Allison Babin                                      ___________________________________________________________________________  Witness signature                                                                    ___________________________________________________________________________  Provider signature- Mindy Martinez MD

## 2021-06-21 NOTE — LETTER
Letter by Jenn Rawls DO at      Author: Jenn Rawls DO Service: -- Author Type: --    Filed:  Encounter Date: 12/22/2020 Status: (Other)         [facility]  12/22/20    Patient: Allison Babin  YOB: 1995  Medical Record Number: [id number]  CSN: [id number]                                                                              Non-opioid Controlled Substance Agreement    I understand that my care provider has prescribed a controlled substance to help manage my condition(s). I am taking this medicine to help me function or work. I know this is strong medicine, and that it can cause serious side effects. Controlled substances can be sedating, addicting and may cause a dependency on the drug. They can affect my ability to drive or think, and cause depression. They need to be taken exactly as prescribed. Combining controlled substances with certain medicines or chemicals (such as cocaine, sedatives and tranquilizers, sleeping pills, meth) can be dangerous or even fatal. Also, if I stop controlled substances suddenly, I may have severe withdrawal symptoms.  If not helpful, I may be asked to stop them.    The risks, benefits, and side effects of these medicine(s) were explained to me. I agree that:    1. I will take part in other treatments as advised by my care team. This may be psychiatry or counseling, physical therapy, behavioral therapy, group treatment or a referral to a pain clinic. I will reduce or stop my medicine when my care team tells me to do so.  2. I will take my medicines as prescribed. I will not change the dose or schedule unless my care team tells me to. There will be no refills if I run out early.  I may be contactedwithout warning and asked to complete a urine drug test or pill count at any time.   3. I will keep all my appointments, and understand this is part of the monitoring of controlled substances. My care team may require an office visit  for EVERY controlled substance refill. If I miss appointments or dont follow instructions, my care team may stop my medicine.  4. I will not ask other providers to prescribe controlled substances, and I will not accept controlled substances from other people. If I need another prescribed controlled substance for a new reason, I will tell my care team within 1 business day.  5. I will use one pharmacy to fill all of my controlled substance prescriptions, and it is up to me to make sure that I do not run out of my medicines on weekends or holidays. If my care team is willing to refill my controlled substance prescription without a visit, I must request refills only during office hours, refills may take up to 3 days to process, and it may take up to 5 to 7 days for my medicine to be mailed and ready at my pharmacy. Prescriptions will not be mailed anywhere except my pharmacy.    6. I am responsible for my prescriptions. If the medicine/prescription is lost or stolen, it will not be replaced. I also agree not to share controlled substance medicines with anyone.          [facility]  12/22/20  Patient:  Allison Babin  YOB: 1995  Medical Record Number: [id number]  CSN: [id number]    7. I agree to not use ANY illegal or recreational drugs. This includes marijuana, cocaine, bath salts or other drugs. I agree not to use alcohol unless my care team says I may. I agree to give urine samples whenever asked. If I dont give a urine sample, the care team may stop my medicine.    8. If I enroll in the [place] Medical Marijuana program, I will tell my care team. I will also sign an agreement to share my medical records with my care team.    9. I will bring in my list of medicines (or my medicine bottles) each time I come to the clinic.   10. I will tell my care team right away if I become pregnant or have a new medical problem treated outside of my regular clinic.  11. I understand that  this medicine can affect my thinking and judgment. It may be unsafe for me to drive, use machinery and do dangerous tasks. I will not do any of these things until I know how the medicine affects me. If my dose changes, I will wait to see how it affects me. I will contact my care team if I have concerns about medicine side effects.    I understand that if I do not follow any of the conditions above, my prescriptions or treatment may be stopped.      I agree that my provider, clinic care team, and pharmacy may work with any city, state or federal law enforcement agency that investigates the misuse, sale, or other diversion of my controlled medicine. I will allow my provider to discuss my care with or share a copy of this agreement with any other treating provider, pharmacy or emergency room where I receive care. I agree to give up (waive) any right of privacy or confidentiality with respect to these consents.   I have read this agreement and have asked questions about anything I did not understand.    ___________________________________________________________________________  Patient signature - Date/Time  -Allison Babin                                      ___________________________________________________________________________  Witness signature                                                                    ___________________________________________________________________________  Provider signature- Jenn Rawls DO

## 2021-06-24 NOTE — TELEPHONE ENCOUNTER
Refill Approved    Rx renewed per Medication Renewal Policy. Medication was last renewed on 8/15/2018 with 1 refill.   Last office visit: 8/15/2018 with PADMINI Morel CNP @ [facility] clinic.     Zeina Ng, [facility] Triage/Med Refill 2/27/2019     Requested Prescriptions   Pending Prescriptions Disp Refills     hydrOXYzine HCl (ATARAX) 25 MG tablet [Pharmacy Med Name: HYDROXYZINE HCL 25MG TABS (WHITE)] 30 tablet 0     Sig: TAKE 1 TO 2 TABLETS BY MOUTH THREE TIMES DAILY AS NEEDED FOR ITCHING    Antihistamine Refill Protocol Passed - 2/27/2019  7:40 PM       Passed - Patient has had office visit/physical in last year    Last office visit with prescriber/PCP: 8/15/2018 Loraine Morel CNP OR same dept: 8/15/2018 Loraine Morel CNP OR same specialty: 8/15/2018 Loraine Morel CNP  Last physical: Visit date not found Last MTM visit: Visit date not found   Next visit within 3 mo: Visit date not found  Next physical within 3 mo: Visit date not found  Prescriber OR PCP: Loraine Morel CNP  Last diagnosis associated with med order: 1. Anxiety disorder  - hydrOXYzine HCl (ATARAX) 25 MG tablet [Pharmacy Med Name: HYDROXYZINE HCL 25MG TABS (WHITE)]; TAKE 1 TO 2 TABLETS BY MOUTH THREE TIMES DAILY AS NEEDED FOR ITCHING  Dispense: 30 tablet; Refill: 0    If protocol passes may refill for 12 months if within 3 months of last provider visit (or a total of 15 months).

## 2021-06-25 ENCOUNTER — RECORDS - HEALTHEAST (OUTPATIENT)
Dept: FAMILY MEDICINE | Facility: CLINIC | Age: 26
End: 2021-06-25

## 2021-06-25 DIAGNOSIS — F40.243 FEAR OF FLYING: ICD-10-CM

## 2021-06-26 NOTE — TELEPHONE ENCOUNTER
Controlled Substance Refill Request  Medication Name:   Requested Prescriptions     Pending Prescriptions Disp Refills     LORazepam (ATIVAN) 0.5 MG tablet [Pharmacy Med Name: LORAZEPAM 0.5MG TABLETS] 10 tablet 0     Sig: TAKE 1/2 TABLET BY MOUTH WITH FLYING. IF NOT EFFECTIVE MAY TAKE THE OTHER 1/2 TABLET     Date Last Fill: 12/22/20  [facility]ed [facility]acy: [facility]  Submit electronically to pharmacy  Controlled Substance Agreement on file:   Encounter-Level CSA Scan Date:    There are no encounter-level csa scan date.        Last office visit:  12/22/20         Melissa Rosenbaum RN, BSN Nurse Triage Advisor 11:17 AM 6/25/2021

## 2021-06-28 RX ORDER — LORAZEPAM 0.5 MG/1
TABLET ORAL
Qty: 10 TABLET | Refills: 0 | Status: SHIPPED | OUTPATIENT
Start: 2021-06-28 | End: 2022-02-18

## 2021-07-03 NOTE — ADDENDUM NOTE
Addendum Note by Louis De La Vega DO at 12/22/2020  7:20 AM     Author: Louis De La Vega DO Service: -- Author Type: Physician    Filed: 12/22/2020  3:14 PM Encounter Date: 12/22/2020 Status: Signed    : Louis De La Vega DO (Physician)    Addended by: LOUIS DE LA VEGA on: 12/22/2020 03:14 PM        Modules accepted: Orders

## 2021-07-07 NOTE — TELEPHONE ENCOUNTER
Patient seen 12/2020 for anxiety and fear of flying.  Short course of Ativan prescribed at that time after appropriate UDS was obtained.   reviewed and appropriate.  Will send an additional few tabs.  She will need a office visit follow-up prior to further refills.

## 2021-07-07 NOTE — TELEPHONE ENCOUNTER
Terell Barajas, I haven't seen this patient since 2018. Looks like you saw her in 2020. i'll let you decide if you are comfortable with refilling for patient.

## 2021-10-03 ENCOUNTER — HEALTH MAINTENANCE LETTER (OUTPATIENT)
Age: 26
End: 2021-10-03

## 2021-11-09 DIAGNOSIS — F41.9 ANXIETY DISORDER: ICD-10-CM

## 2021-11-10 RX ORDER — HYDROXYZINE HYDROCHLORIDE 25 MG/1
TABLET, FILM COATED ORAL
Qty: 540 TABLET | Refills: 0 | Status: SHIPPED | OUTPATIENT
Start: 2021-11-10

## 2021-11-10 NOTE — TELEPHONE ENCOUNTER
"Last Written Prescription Date:  9/29/20  Last Fill Quantity: 30,  # refills: 3   Last office visit provider:  12/22/20     Requested Prescriptions   Pending Prescriptions Disp Refills     hydrOXYzine (ATARAX) 25 MG tablet [Pharmacy Med Name: HYDROXYZINE HCL 25MG TABS (WHITE)] 30 tablet 3     Sig: TAKE 1 TO 2 TABLETS BY MOUTH THREE TIMES DAILY AS NEEDED FOR ITCHING       Antihistamines Protocol Passed - 11/9/2021 12:42 PM        Passed - Recent (12 mo) or future (30 days) visit within the authorizing provider's specialty     Patient has had an office visit with the authorizing provider or a provider within the authorizing providers department within the previous 12 mos or has a future within next 30 days. See \"Patient Info\" tab in inbasket, or \"Choose Columns\" in Meds & Orders section of the refill encounter.              Passed - Patient is age 3 or older     Apply age and/or weight-based dosing for peds patients age 3 and older.    Forward request to provider for patients under the age of 3.          Passed - Medication is active on med list             Rupert Guzmán RN 11/10/21 2:54 PM  "

## 2022-01-19 DIAGNOSIS — F41.9 ANXIETY DISORDER: ICD-10-CM

## 2022-01-23 NOTE — TELEPHONE ENCOUNTER
"Routing refill request to provider for review/approval because:  A break in medication  Patient needs to be seen because it has been more than 1 year since last office visit.    Last Written Prescription Date:  12/22/2020  Last Fill Quantity: 90,  # refills: 1   Last office visit provider:  12/22/2020     Requested Prescriptions   Pending Prescriptions Disp Refills     sertraline (ZOLOFT) 100 MG tablet [Pharmacy Med Name: SERTRALINE 100MG TABLETS] 90 tablet 1     Sig: TAKE 1 TABLET(100 MG) BY MOUTH DAILY       SSRIs Protocol Failed - 1/19/2022  5:57 PM        Failed - Recent (12 mo) or future (30 days) visit within the authorizing provider's specialty     Patient has had an office visit with the authorizing provider or a provider within the authorizing providers department within the previous 12 mos or has a future within next 30 days. See \"Patient Info\" tab in inbasket, or \"Choose Columns\" in Meds & Orders section of the refill encounter.              Passed - Medication is active on med list        Passed - Patient is age 18 or older        Passed - No active pregnancy on record        Passed - No positive pregnancy test in last 12 months             Phyllis Vinson RN 01/22/22 11:01 PM  "

## 2022-01-24 RX ORDER — SERTRALINE HYDROCHLORIDE 100 MG/1
100 TABLET, FILM COATED ORAL DAILY
Qty: 30 TABLET | Refills: 0 | Status: SHIPPED | OUTPATIENT
Start: 2022-01-24 | End: 2022-02-18

## 2022-01-24 NOTE — TELEPHONE ENCOUNTER
Left message to call back. Please notify pt a refill was sent to her pharmacy but needs an ov for further refills. Only a 30 day supply was sent.

## 2022-01-25 DIAGNOSIS — F41.9 ANXIETY DISORDER: ICD-10-CM

## 2022-01-26 RX ORDER — SERTRALINE HYDROCHLORIDE 100 MG/1
TABLET, FILM COATED ORAL
Qty: 30 TABLET | Refills: 0 | OUTPATIENT
Start: 2022-01-26

## 2022-01-26 NOTE — TELEPHONE ENCOUNTER
Duplicate Rx, medication was sent in recently to same pharmacy requested.     Refill was just sent in on 1/24/22.    sertraline (ZOLOFT) 100 MG tablet 30 tablet 0 1/24/2022  No   Sig - Route: Take 1 tablet (100 mg) by mouth daily Need to be seen before further refills - Oral   Sent to pharmacy as: Sertraline HCl 100 MG Oral Tablet (ZOLOFT)   Class: E-Prescribe   Order: [id number]   E-Prescribing Status: Sent to pharmacy (1/24/2022  9:07 AM CST)         Melissa Rosenbaum RN, BSN Nurse Triage Advisor 3:31 PM 1/26/2022

## 2022-02-18 ENCOUNTER — VIRTUAL VISIT (OUTPATIENT)
Dept: FAMILY MEDICINE | Facility: CLINIC | Age: 27
End: 2022-02-18
Payer: COMMERCIAL

## 2022-02-18 DIAGNOSIS — Z11.59 ENCOUNTER FOR HEPATITIS C SCREENING TEST FOR LOW RISK PATIENT: ICD-10-CM

## 2022-02-18 DIAGNOSIS — Z13.220 SCREENING FOR HYPERLIPIDEMIA: Primary | ICD-10-CM

## 2022-02-18 DIAGNOSIS — F41.9 ANXIETY DISORDER, UNSPECIFIED TYPE: ICD-10-CM

## 2022-02-18 PROCEDURE — 99214 OFFICE O/P EST MOD 30 MIN: CPT | Mod: GT | Performed by: FAMILY MEDICINE

## 2022-02-18 NOTE — PROGRESS NOTES
Allison is a 26 year old who is being evaluated via a billable video visit.      How would you like to obtain your AVS? MyChart  If the video visit is dropped, the invitation should be resent by: Text to cell phone: [phone]  Will anyone else be joining your video visit? No    Video Start Time: 9:56 AM    Assessment & Plan     Anxiety disorder, unspecified type  Patient is doing well.  Would like to wean off of sertraline.  She is currently taking 100 mg daily.  We will start decreasing to 50 mg daily for next 3 months.  Follow-up to see how she is doing.  After that if she still doing well we will go down to 25 mg daily for another 3 months then 25 mg every other day for a couple weeks then she can discontinue altogether. Pt agreed with plan.   - sertraline (ZOLOFT) 50 MG tablet  Dispense: 90 tablet; Refill: 0  - TSH with free T4 reflex  - Vitamin D Deficiency  - Comprehensive metabolic panel (BMP + Alb, Alk Phos, ALT, AST, Total. Bili, TP)  - CBC with platelets    Screening for hyperlipidemia  - Lipid panel    Encounter for hepatitis C screening test for low risk patient  - Hepatitis C Screen Reflex to HCV RNA Quant and Genotype        Return in about 3 months (around 5/18/2022) for with me, in person, Routine preventive.    Lakisha Zepeda MD  [facility]   Allison is a 26 year old who presents for the following health issues     HPI   Chief Complaint   Patient presents with     Medication Therapy Management     Refills need- wants to talk about a plan about starting to get off of this medication      Feels good on sertraline.  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   Uses hydroxyzine as needed.   Wants to start weaning off.    Review of Systems   Constitutional, HEENT, cardiovascular, pulmonary, gi and gu systems are negative, except as otherwise noted.      Objective           Vitals:  No vitals were obtained today due to virtual visit.    Physical Exam   GENERAL:  Healthy, alert and no distress  EYES: Eyes grossly normal to inspection.  No discharge or erythema, or obvious scleral/conjunctival abnormalities.  RESP: No audible wheeze, cough, or visible cyanosis.  No visible retractions or increased work of breathing.    SKIN: Visible skin clear. No significant rash, abnormal pigmentation or lesions.  NEURO: Cranial nerves grossly intact.  Mentation and speech appropriate for age.  PSYCH: Mentation appears normal, affect normal/bright, judgement and insight intact, normal speech and appearance well-groomed.         Video-Visit Details    Type of service:  Video Visit    Video End Time:11:37 AM    Originating Location (pt. Location): Home    Distant Location (provider location):  [facility]     Platform used for Video Visit: RandyWell

## 2022-07-10 ENCOUNTER — HEALTH MAINTENANCE LETTER (OUTPATIENT)
Age: 27
End: 2022-07-10

## 2022-09-10 ENCOUNTER — HEALTH MAINTENANCE LETTER (OUTPATIENT)
Age: 27
End: 2022-09-10

## 2023-07-23 ENCOUNTER — HEALTH MAINTENANCE LETTER (OUTPATIENT)
Age: 28
End: 2023-07-23

## 2024-09-15 ENCOUNTER — HEALTH MAINTENANCE LETTER (OUTPATIENT)
Age: 29
End: 2024-09-15

## 2025-07-07 ENCOUNTER — DOCUMENTATION ONLY (OUTPATIENT)
Dept: FAMILY MEDICINE | Facility: CLINIC | Age: 30
End: 2025-07-07
Payer: COMMERCIAL

## 2025-07-07 NOTE — PROGRESS NOTES
Pt notified that provider states she will enter orders at the time of visit as she hasn't been seen in 3  years, pt verbalized  understanding, but was concerned that she would need to reschedule her appt because her job does not allow her to fast all day, advised pt to send a Cannaehart message to care team to verbalize her concerns and verify that her provider is requiring her to fast.  Please give pt a call back as she has questions.  Thank you.    Unique CABRERA, CMA

## 2025-07-07 NOTE — PROGRESS NOTES
Allison Babin has an upcoming lab appointment:    Future Appointments   Date Time Provider Department Center   7/8/2025  7:00 AM TS LAB [facility]   7/9/2025  4:00 PM [facility]     Patient is scheduled for the following lab(s): Annual Wellness labs per pt.    There is no order available. Please review and place either future orders or HMPO (Review of Health Maintenance Protocol Orders), as appropriate.    Health Maintenance Due   Topic    HEPATITIS C SCREENING     ANNUAL REVIEW OF HM ORDERS      Unique Pabon

## 2025-07-07 NOTE — TELEPHONE ENCOUNTER
Has not been seen since 2022.  Will see patient in clinic and then have her schedule follow-up fasting lab visit if needed    Jenn Rawls, DO

## 2025-07-09 ENCOUNTER — OFFICE VISIT (OUTPATIENT)
Dept: FAMILY MEDICINE | Facility: CLINIC | Age: 30
End: 2025-07-09
Payer: COMMERCIAL

## 2025-07-09 VITALS
SYSTOLIC BLOOD PRESSURE: 117 MMHG | RESPIRATION RATE: 16 BRPM | BODY MASS INDEX: 23.56 KG/M2 | WEIGHT: 138 LBS | HEART RATE: 87 BPM | HEIGHT: 64 IN | TEMPERATURE: 98.6 F | OXYGEN SATURATION: 100 % | DIASTOLIC BLOOD PRESSURE: 73 MMHG

## 2025-07-09 DIAGNOSIS — Z00.00 ROUTINE GENERAL MEDICAL EXAMINATION AT A HEALTH CARE FACILITY: Primary | ICD-10-CM

## 2025-07-09 DIAGNOSIS — F41.0 PANIC ATTACK: ICD-10-CM

## 2025-07-09 PROBLEM — Z79.899 CONTROLLED SUBSTANCE AGREEMENT SIGNED: Status: RESOLVED | Noted: 2019-11-12 | Resolved: 2025-07-09

## 2025-07-09 PROCEDURE — 3078F DIAST BP <80 MM HG: CPT | Performed by: FAMILY MEDICINE

## 2025-07-09 PROCEDURE — 99213 OFFICE O/P EST LOW 20 MIN: CPT | Mod: 25 | Performed by: FAMILY MEDICINE

## 2025-07-09 PROCEDURE — 99385 PREV VISIT NEW AGE 18-39: CPT | Performed by: FAMILY MEDICINE

## 2025-07-09 PROCEDURE — 3074F SYST BP LT 130 MM HG: CPT | Performed by: FAMILY MEDICINE

## 2025-07-09 SDOH — HEALTH STABILITY: PHYSICAL HEALTH: ON AVERAGE, HOW MANY DAYS PER WEEK DO YOU ENGAGE IN MODERATE TO STRENUOUS EXERCISE (LIKE A BRISK WALK)?: 6 DAYS

## 2025-07-09 ASSESSMENT — SOCIAL DETERMINANTS OF HEALTH (SDOH): HOW OFTEN DO YOU GET TOGETHER WITH FRIENDS OR RELATIVES?: THREE TIMES A WEEK

## 2025-07-09 NOTE — PATIENT INSTRUCTIONS
Patient Education   Preventive Care Advice   This is general advice given by our system to help you stay healthy. However, your care team may have specific advice just for you. Please talk to your care team about your preventive care needs.  Nutrition  Eat 5 or more servings of fruits and vegetables each day.  Try wheat bread, brown rice and whole grain pasta (instead of white bread, rice, and pasta).  Get enough calcium and vitamin D. Check the label on foods and aim for 100% of the RDA (recommended daily allowance).  Lifestyle  Exercise at least 150 minutes each week  (30 minutes a day, 5 days a week).  Do muscle strengthening activities 2 days a week. These help control your weight and prevent disease.  No smoking.  Wear sunscreen to prevent skin cancer.  Have a dental exam and cleaning every 6 months.  Yearly exams  See your health care team every year to talk about:  Any changes in your health.  Any medicines your care team has prescribed.  Preventive care, family planning, and ways to prevent chronic diseases.  Shots (vaccines)   HPV shots (up to age 26), if you've never had them before.  Hepatitis B shots (up to age 59), if you've never had them before.  COVID-19 shot: Get this shot when it's due.  Flu shot: Get a flu shot every year.  Tetanus shot: Get a tetanus shot every 10 years.  Pneumococcal, hepatitis A, and RSV shots: Ask your care team if you need these based on your risk.  Shingles shot (for age 50 and up)  General health tests  Diabetes screening:  Starting at age 35, Get screened for diabetes at least every 3 years.  If you are younger than age 35, ask your care team if you should be screened for diabetes.  Cholesterol test: At age 39, start having a cholesterol test every 5 years, or more often if advised.  Bone density scan (DEXA): At age 50, ask your care team if you should have this scan for osteoporosis (brittle bones).  Hepatitis C: Get tested at least once in your life.  STIs (sexually  transmitted infections)  Before age 24: Ask your care team if you should be screened for STIs.  After age 24: Get screened for STIs if you're at risk. You are at risk for STIs (including HIV) if:  You are sexually active with more than one person.  You don't use condoms every time.  You or a partner was diagnosed with a sexually transmitted infection.  If you are at risk for HIV, ask about PrEP medicine to prevent HIV.  Get tested for HIV at least once in your life, whether you are at risk for HIV or not.  Cancer screening tests  Cervical cancer screening: If you have a cervix, begin getting regular cervical cancer screening tests starting at age 21.  Breast cancer scan (mammogram): If you've ever had breasts, begin having regular mammograms starting at age 40. This is a scan to check for breast cancer.  Colon cancer screening: It is important to start screening for colon cancer at age 45.  Have a colonoscopy test every 10 years (or more often if you're at risk) Or, ask your provider about stool tests like a FIT test every year or Cologuard test every 3 years.  To learn more about your testing options, visit:   .  For help making a decision, visit:   https://bit.ly/mn60229.  Prostate cancer screening test: If you have a prostate, ask your care team if a prostate cancer screening test (PSA) at age 55 is right for you.  Lung cancer screening: If you are a current or former smoker ages 50 to 80, ask your care team if ongoing lung cancer screenings are right for you.  For informational purposes only. Not to replace the advice of your health care provider. Copyright   2023 [facility]. All rights reserved. Clinically reviewed by the [facility] Transitions Program. [place] 913122 - REV 01/24.  Learning About Stress  What is stress?     Stress is your body's response to a hard situation. Your body can have a physical, emotional, or mental response. Stress is a fact of life for most people, and it  affects everyone differently. What causes stress for you may not be stressful for someone else.  A lot of things can cause stress. You may feel stress when you go on a job interview, take a test, or run a race. This kind of short-term stress is normal and even useful. It can help you if you need to work hard or react quickly. For example, stress can help you finish an important job on time.  Long-term stress is caused by ongoing stressful situations or events. Examples of long-term stress include long-term health problems, ongoing problems at work, or conflicts in your family. Long-term stress can harm your health.  How does stress affect your health?  When you are stressed, your body responds as though you are in danger. It makes hormones that speed up your heart, make you breathe faster, and give you a burst of energy. This is called the fight-or-flight stress response. If the stress is over quickly, your body goes back to normal and no harm is done.  But if stress happens too often or lasts too long, it can have bad effects. Long-term stress can make you more likely to get sick, and it can make symptoms of some diseases worse. If you tense up when you are stressed, you may develop neck, shoulder, or low back pain. Stress is linked to high blood pressure and heart disease.  Stress also harms your emotional health. It can make you calderon, tense, or depressed. Your relationships may suffer, and you may not do well at work or school.  What can you do to manage stress?  You can try these things to help manage stress:   Do something active. Exercise or activity can help reduce stress. Walking is a great way to get started. Even everyday activities such as housecleaning or yard work can help.  Try yoga or leydi chi. These techniques combine exercise and meditation. You may need some training at first to learn them.  Do something you enjoy. For example, listen to music or go to a movie. Practice your hobby or do volunteer  "work.  Meditate. This can help you relax, because you are not worrying about what happened before or what may happen in the future.  Do guided imagery. Imagine yourself in any setting that helps you feel calm. You can use online videos, books, or a teacher to guide you.  Do breathing exercises. For example:  From a standing position, bend forward from the waist with your knees slightly bent. Let your arms dangle close to the floor.  Breathe in slowly and deeply as you return to a standing position. Roll up slowly and lift your head last.  Hold your breath for just a few seconds in the standing position.  Breathe out slowly and bend forward from the waist.  Let your feelings out. Talk, laugh, cry, and express anger when you need to. Talking with supportive friends or family, a counselor, or a nimisha leader about your feelings is a healthy way to relieve stress. Avoid discussing your feelings with people who make you feel worse.  Write. It may help to write about things that are bothering you. This helps you find out how much stress you feel and what is causing it. When you know this, you can find better ways to cope.  What can you do to prevent stress?  You might try some of these things to help prevent stress:  Manage your time. This helps you find time to do the things you want and need to do.  Get enough sleep. Your body recovers from the stresses of the day while you are sleeping.  Get support. Your family, friends, and community can make a difference in how you experience stress.  Limit your news feed. Avoid or limit time on social media or news that may make you feel stressed.  Do something active. Exercise or activity can help reduce stress. Walking is a great way to get started.  Where can you learn more?  Go to https://www.Apse.net/patiented  Enter N032 in the search box to learn more about \"Learning About Stress.\"  Current as of: October 24, 2024  Content Version: 14.5 2024-2025 [employer].   Care instructions adapted under license by your healthcare professional. If you have questions about a medical condition or this instruction, always ask your healthcare professional. [employer] disclaims any warranty or liability for your use of this information.

## 2025-07-09 NOTE — PROGRESS NOTES
Preventive Care Visit  [facility]  Jenn Rawls DO, Family Medicine  Jul 9, 2025      Assessment & Plan     Routine general medical examination at a health care facility    Counseling  Appropriate preventive services were addressed with this patient via screening, questionnaire, or discussion as appropriate for fall prevention, nutrition, physical activity, social engagement, weight loss and cognition.  Checklist reviewing preventive services available has been given to the patient.  Reviewed patient's diet, addressing concerns and/or questions.   She is at risk for psychosocial distress and has been provided with information to reduce risk.   Reviewed preventive health counseling, as reflected in patient instructions       Regular exercise       Healthy diet/nutrition       Vision screening       Immunizations  No vaccines given today.  Scheduled for TDAP         Contraception    Panic attack  Stable with prn hydroxyzine. Defers need for selective serotonin reuptake inhibitor.         Bernardo Cooper is a 30 year old, presenting for the following:  Physical (Not fasting - labs, no questions/concerns)        7/9/2025     3:48 PM   Additional Questions   Roomed by Stephani TALLEY CMA   Accompanied by Tiki - mom          HPI    Hydroxyzine up to 25 mg prn panic attacks. Panic sxs, particularly during    Advance Care Planning    Discussed advance care planning with patient; informed AVS has link to [facility] [employer].        7/9/2025   General Health   How would you rate your overall physical health? Excellent   Feel stress (tense, anxious, or unable to sleep) Very much   (!) STRESS CONCERN      7/9/2025   Nutrition   Three or more servings of calcium each day? Yes   Diet: Regular (no restrictions)   How many servings of fruit and vegetables per day? (!) 2-3   How many sweetened beverages each day? 0-1         7/9/2025   Exercise   Days per week of moderate/strenous exercise 6 days          "7/9/2025   Social Factors   Frequency of gathering with friends or relatives Three times a week   Worry food won't last until get money to buy more No   Food not last or not have enough money for food? No   Do you have housing? (Housing is defined as stable permanent housing and does not include staying outside in a car, in a tent, in an abandoned building, in an overnight shelter, or couch-surfing.) Yes   Are you worried about losing your housing? No   Lack of transportation? No   Unable to get utilities (heat,electricity)? No         7/9/2025   Dental   Dentist two times every year? Yes         Today's PHQ-2 Score:       7/9/2025     3:44 PM   PHQ-2 ( 1999 [employer])   Q1: Little interest or pleasure in doing things 0   Q2: Feeling down, depressed or hopeless 0   PHQ-2 Score 0    Q1: Little interest or pleasure in doing things Not at all   Q2: Feeling down, depressed or hopeless Not at all   PHQ-2 Score 0       Patient-reported           7/9/2025   Substance Use   Alcohol more than 3/day or more than 7/wk No   Do you use any other substances recreationally? No     Social History     Tobacco Use    Smoking status: Never     Passive exposure: Never    Smokeless tobacco: Never   Substance Use Topics    Alcohol use: Yes     Comment: Alcoholic Drinks/day: 1-2 drinks a couple times month    Drug use: No          Mammogram Screening - Patient under 40 years of age: Routine Mammogram Screening not recommended.         7/9/2025   STI Screening   New sexual partner(s) since last STI/HIV test? No     History of abnormal Pap smear: No - age 21-29 PAP every 3 years recommended             7/9/2025   Contraception/Family Planning   Questions about contraception or family planning No   What are your periods like? Regular        Reviewed and updated as needed this visit by Provider                       Objective    Exam  /73   Pulse 87   Temp 98.6  F (37  C) (Oral)   Resp 16   Ht 1.613 m (5' 3.5\")   Wt (S) 62.6 kg (138 " "lb)   LMP 07/09/2025 (Exact Date)   SpO2 100%   BMI 24.06 kg/m     Estimated body mass index is 24.06 kg/m  as calculated from the following:    Height as of this encounter: 1.613 m (5' 3.5\").    Weight as of this encounter: 62.6 kg (138 lb).    Physical Exam  GENERAL: alert and no distress  EYES: Eyes grossly normal to inspection, PERRL and conjunctivae and sclerae normal  HENT: ear canals and TM's normal, nose and mouth without ulcers or lesions  NECK: no adenopathy, no asymmetry, masses, or scars  RESP: lungs clear to auscultation - no rales, rhonchi or wheezes  CV: regular rate and rhythm, normal S1 S2, no S3 or S4, no murmur, click or rub, no peripheral edema  ABDOMEN: soft, nontender  MS: no gross musculoskeletal defects noted, no edema  SKIN: no suspicious lesions or rashes  NEURO: Normal strength and tone, mentation intact and speech normal  PSYCH: mentation appears normal, affect normal/bright        Signed Electronically by: Jenn Rawls DO    "

## 2025-07-24 ENCOUNTER — OFFICE VISIT (OUTPATIENT)
Dept: FAMILY MEDICINE | Facility: CLINIC | Age: 30
End: 2025-07-24
Payer: COMMERCIAL

## 2025-07-24 VITALS
DIASTOLIC BLOOD PRESSURE: 74 MMHG | RESPIRATION RATE: 16 BRPM | HEIGHT: 63 IN | OXYGEN SATURATION: 100 % | HEART RATE: 78 BPM | TEMPERATURE: 98.3 F | WEIGHT: 135 LBS | BODY MASS INDEX: 23.92 KG/M2 | SYSTOLIC BLOOD PRESSURE: 114 MMHG

## 2025-07-24 DIAGNOSIS — Z23 ENCOUNTER FOR IMMUNIZATION: ICD-10-CM

## 2025-07-24 DIAGNOSIS — Z12.4 CERVICAL CANCER SCREENING: Primary | ICD-10-CM

## 2025-07-24 DIAGNOSIS — F41.9 ANXIETY: ICD-10-CM

## 2025-07-24 NOTE — PROGRESS NOTES
"  Assessment & Plan     Cervical cancer screening  - Pap Screen Reflex to HPV if ASCUS - Recommended Age 25 - 29 Years    Encounter for immunization  Tdap updated today.     Anxiety  Stable with hydroxyzine as needed      Subjective   Allison is a 30 year old, presenting for the following health issues:  Gyn Exam (Pap and immunizations)        7/24/2025    10:12 AM   Additional Questions   Roomed by Stephani TALLEY CMA   Accompanied by mom     History of Present Illness       Reason for visit:  Pap and tetnus shot   She is taking medications regularly.        Following up today for Pap smear and Tdap.  Needle phobia, mom with her today for support.  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  Uses hydroxyzine as needed.  Previously pursued psychotherapy and has worked on coping skills.      Objective    /74   Pulse 78   Temp 98.3  F (36.8  C) (Oral)   Resp 16   Ht 1.61 m (5' 3.39\")   Wt 61.2 kg (135 lb)   LMP 07/09/2025 (Exact Date)   SpO2 100%   BMI 23.62 kg/m    Body mass index is 23.62 kg/m .  Physical Exam   GENERAL: alert and no distress  RESP: lungs clear to auscultation - no rales, rhonchi or wheezes  CV: regular rate and rhythm, normal S1 S2, no S3 or S4, no murmur, click or rub, no peripheral edema   (female) w/bimanual: normal female external genitalia, normal urethral meatus, normal vaginal mucosa, and normal cervix/adnexa/uterus without masses or discharge  PSYCH: mentation appears normal, affect normal/bright    Prior to immunization administration, verified patients identity using patient s name and date of birth. Please see Immunization Activity for additional information.     Screening Questionnaire for Adult Immunization    Are you sick today?   No   Do you have allergies to medications, food, a vaccine component or latex?   No   Have you ever had a serious reaction after receiving a vaccination?   No   Do you have a long-term health problem with " heart, lung, kidney, or metabolic disease (e.g., diabetes), asthma, a blood disorder, no spleen, complement component deficiency, a cochlear implant, or a spinal fluid leak?  Are you on long-term aspirin therapy?   No   Do you have cancer, leukemia, HIV/AIDS, or any other immune system problem?   No   Do you have a parent, brother, or sister with an immune system problem?   No   In the past 3 months, have you taken medications that affect  your immune system, such as prednisone, other steroids, or anticancer drugs; drugs for the treatment of rheumatoid arthritis, Crohn s disease, or psoriasis; or have you had radiation treatments?   No   Have you had a seizure, or a brain or other nervous system problem?   No   During the past year, have you received a transfusion of blood or blood    products, or been given immune (gamma) globulin or antiviral drug?   No   For women: Are you pregnant or is there a chance you could become       pregnant during the next month?   No   Have you received any vaccinations in the past 4 weeks?   No     Immunization questionnaire answers were all negative.      Patient instructed to remain in clinic for 15 minutes afterwards, and to report any adverse reactions.     Screening performed by Stephani Campos MA on 7/24/2025 at 2:16 PM.               Signed Electronically by: Jenn Rawls DO

## 2025-07-29 LAB
BKR LAB AP GYN ADEQUACY: NORMAL
BKR LAB AP GYN INTERPRETATION: NORMAL
BKR LAB AP HPV REFLEX: NORMAL
BKR LAB AP PREVIOUS ABNORMAL: NORMAL
PATH REPORT.COMMENTS IMP SPEC: NORMAL
PATH REPORT.COMMENTS IMP SPEC: NORMAL
PATH REPORT.RELEVANT HX SPEC: NORMAL